# Patient Record
Sex: MALE | Race: WHITE
[De-identification: names, ages, dates, MRNs, and addresses within clinical notes are randomized per-mention and may not be internally consistent; named-entity substitution may affect disease eponyms.]

---

## 2021-03-13 NOTE — NUR
SHIFT SUMMARY
PT A&OX4, VSS, NPO, PLAN FOR PEGTUBE PLACEMENT TOMORROW. IVF @ 75 MLS/HR. PAIN
MANAGED WITH 25 MCG. VOIDING WELL. USES CALL LIGHT APPROPRIATELY. WILL REPORT
TO ONCOMING NOC RN.

## 2021-03-14 NOTE — NUR
SHIFT SUMMARY
PT A&OX4, VSS, AMBULATING INDEPENDENT TO BRP, VOIDING WELL, NPO, CBGS AC/HS.
PAIN MANAGED WITH 50 MCGS FENT. S/P GASTROSOMY TUBE PLACEMENT; TUBE FEEDINGS
RUNNING PER ORDER. WILL REPORT TO ONCOMING NOC RN.

## 2021-03-14 NOTE — NUR
SHIFT SUMMARY:
MONET IS A&OX4. BP ELEVATED, HYDRALAZINE MINIMALLY EFFECTIVE. PT REPORTS GOOD
PAIN RELIEF WITH 50 MCG OF FENTANYL. HE IS INDEPENDENT IN THE ROOM. IV TO L AC
PATENT. HE REMAINS NPO, SWABS PROVIDED. HE USES THE CALL LIGHT APPROPRIATELY.
HE IS LYING IN BED WITH THE CALL LIGHT IN REACH. WILL REPORT TO DAY SHIFT RN.

## 2021-03-14 NOTE — NUR
03/14/21 1324 Ector Valderrama
History, Chart, Medications and Allergies reviewed before start of
procedure.MONITOR INTACT WITH CONTINUOUS PULSE OXIMETRY AND
INTERMITTENT BP.3-LEAD EKG REVIEWED WITH PHYSICIAN PRIOR TO START OF
PROCEDURE.O2 VIA N/C INTACT THROUGHOUT SEDATION/PROCEDURE. See
Anesthesia record.

## 2021-03-14 NOTE — NUR
History, Chart, Medications and Allergies reviewed before start of
procedure.
Pre-Op teaching done. Pt verbalizes understanding.

## 2021-03-15 NOTE — NUR
pt has remained a/o x 4, pleasant/cooperative, has worked with physical
therapy, has ambulated to bathroom and in the hallway with wheelchair. pt has
had visits from  this afternoon. pt denies n/v, was evaluated by speech
therapy.

## 2021-03-15 NOTE — NUR
SHIFT SUMMARY:
MONET IS A&OX4. VS W/CONTINUED ELEVATION OF BP WITH MINIMAL IMPROVEMENT AFTER
HYDRALAZINE. HE IS PARTICIPATING WITH HIS PEG TUBE, ATTENTIVE TO EDUCATION AND
WILLING TO BE HANDS-ON. HE REPORTS ADEQUATE PAIN CONTROL WITH 50 MCG OF
FENTANYL. IV TO L AC PATENT. HE IS INDEPENDENT IN THE ROOM, USES THE
WHEELCHAIR TO GO ON "WALKS" AROUND THE HOSPITAL. HE IS LYING IN BED WITH THE
CALL LIGHT IN REACH. WILL REPORT TO DAY SHIFT RN.

## 2021-03-15 NOTE — NUR
assumed care of pt following receiving report from previous RN. pt just out of
shower, dressed and sitting on edge of bed, provided with ice water.

## 2021-03-16 NOTE — NUR
SHIFT SUMMARY
PT TOLERATING INTERMITTEN FEEDS SO FAR TODAY. DENIES FULLNESS OR ABD
DISTENTION. PT HAS NOT HAD A BM IN 5-6 DAYS PER THE PT. BOWEL CARE GIVEN T/O
THE DAY. INCLUDING PRUNE JUICE. AFTERNOON BP ELEVATED. RECHECKED IN THE 170S
SYSTOLIC. PT HOPING TO BE DISCHARGED TOMORROW TO A REHAB CENTER. NO OTHER
ACUTE CHANGES IN ASSESSMENT AT THIS TIME. VS REVIEWED. PT RESTING IN BED &
DENIES OTHER NEEDS AT THIS TIME. CALL LIGHT IN REACH.

## 2021-03-16 NOTE — NUR
SHIFT SUMMARY
AOX4. SLURRED SPEECH. PLEASENT & COOPERATIVE. REPORTS 7/10 CHRONIC BACK PAIN,
MEDICATED 1X c PERCOCET & PT ABLE TO REST. POD 2 FOR PEG TUBE PLACEMENT, PT
NPO. DENIES N/V, ABD PAIN. ACTIVE BT. MEDS CRUSHED & PT. PT REFUSED 2100
FEEDING, STATING HE DIDNT FEEL HUNGRY & WANTED TO BE DISCONECTED FROM PUMP. BP
ELEVATED @170 SYSTOLIC THIS AM, GAVE 10MG HYDRALAZINE, WILL RECHECK BP. HS CBG
@117. AWAITING SNF PLACEMENT. CALL LIGHT IN REACH & WCTM.

## 2021-03-17 NOTE — NUR
SHIFT SUMMARY:
SEVERE DYSPASIA
PATIENT IS ALERT AND ORIENTED X4 WHILE AWAKE. HE HAS BEEN SLEEPING MAJORITY OF
THE SHIFT BUT EASILY AROUSABLE. HE DOES HAVE A HIGH BP BUT OTHERWISE HAS WNL
VS AND IS ON RA. PAIN IS CONTROLLED WITH 1 PERCOCET. HIS PEG TUBE IS WNL. HE
HAS BEEN GETTING BOLUS FEEDS Q4H. HIS PILLS ARE ALSO CRUSHED AND GIVEN THROUGH
PEG TUBE. PATIENT DENIES ANY NAUSEA OR VOMITING WITH FEEDINGS. HE HAS BOWEL
SOUNDS IN ALL FOUR QUADRANTS. HE WALKS WITH A CANE AND IS INDEPENDANT. HE DOES
HAVE A SLIGHT LEFT SIDED WEAKNESS AND A SLUR WITH HIS SPEECH FROM A STROKE HE
HAD A MONTH AGO.
CALLS APPROPRIATELY. CALL LIGHT WITHIN REACH.
THE PLAN IS TO BE DISCHARGED TO A [facility] REHAB LATER TODAY.

## 2021-03-18 NOTE — NUR
SHIFT SUMMARY
PT RESTED WELL THIS SHIFT. AAOX4. NPO. DISCOMFORT CONTROLLED WITH 2 PERCOCET
CRUSHED PER TUBE. NO NAUSEA/EMESIS. PEG TUBE SECURE/NO DRAINAGE. FEEDINGS PER
TUBE PER ORDERS. HTN NOTED, DR NOTIFIED + NEW ORDER FOR IV HYDRALAZINE
ADMINISTERED, BP DECREASED POST ADMINISTRATION. x1 MODERATE SIZE FORMED
STOOL THIS SHIFT. AWAITING TRANSFER TODAY. PT CURRENTLY RESTING IN BED WITH
CALL LIGHT IN REACH.

## 2021-03-18 NOTE — NUR
SHIFT SUMMARY
PT A&OX4, VSS, NPO/PEG TUBE FEEDINGS PER ORDERS. AMBULATING HALLWAYS,
INDEPENDENT TO BRP. DENIES PAIN. SHOWERED TODAY. PLAN FOR TRANSFER TOMORROW.
REPORT PROVIDED TO JOHN RICE.

## 2021-03-19 NOTE — NUR
SPEECH THERAPY DONE SEEING PT, INT FEED INITIATED AT THIS TIME. PT RESTING IN
BED LISTENING TO BOOKS ON TAPE. DENIES PAIN OR NAUSEA AT THIS TIME.

## 2021-03-19 NOTE — NUR
SHIFT SUMMARY
PT RESTED WELL T/O NIGHT. AAOX4. DISCOMFORT CONTROLLED WITH 2 PERCOCET
YESTARDAY EVENING. NO NAUSEA/EMESIS. STRICT NPO. PEG TUBE SECURE WITH
SCHEDULED BOLUS FEEDINGS. ORAL CARE Q4H WHILE AWAKE. NO ACUTE CHANGES OVER
NIGHT. PT AWOKE EARLY THIS AM + AMBULATED IN HALLS INDEPENDENT. AWAITING AM
LABS + 0600 BOLUS FEEDING. PT CURRENTLY RESTING IN ROOM WITH CALL LIGHT IN
REACH.

## 2021-03-19 NOTE — NUR
SHIFT SUMMARY
AA0X4. PT HAS BEEN AMBULATING FREQUENTLY DURING THE DAY. TOLERATING HIS
FEEDINGS WELL, DENIES ANY NAUSEA. PAIN MANAGED PER EMAR ONCE THIS AM, HAS
DENIED PAIN OTHERWISE. PLAN IS FOR PATIENT TO GO TO SNF AT THE START OF NEXT
WEEK.

## 2021-03-20 NOTE — NUR
SHIFT SUMMARY:
 
NO ACUTE CHANGES THIS SHIFT. PT AMBULATING IN HALLWAYS FREQUENTLY. TOLERATING
FEEDS WELL. DENIES N/V. REPORTS PASSING FLATUS. PAIN MANAGED WITH PERCOCET PER
EMAR. PT MEDICATED ONCE THIS SHIFT. PLAN FOR PT/OT. AWAITING SNF PLACEMENT. PT
PLEASANT AND COOPERATIVE WITH CARE.

## 2021-03-20 NOTE — NUR
SUMMARY
REPORTS TOLERATING TUBE FEEDINGS WELL, DENIES ANY ABD DISCOMFORT, AMBULATES
INDEPENDENTLY WITH A CANE TO GO OUTSIDE, PT DOES OWN ORAL CARE PRN, NO ACUTE
CHANGES THIS SHIFT.

## 2021-03-21 NOTE — NUR
SUMMARY
PT FELT "SLEEPY" TODAY, STATES DIDN'T SLEEP WELL LAST NIGHT, C/O CHRONIC LOW
BACK PAIN, REPORTS HAVING ADEQUATE PAIN CONTROL WITH PERCOCET, AMBULATED
OUTSIDE TO SMOKE X6 TODAY, DISCUSSED SMOKING CESSATION WITH PT BUT HAS NO
INTEREST IN QUITTING, REPORTS TOLERATING TUBE FEEDS WELL, BP BETTER TODAY, NO
OTHER CHANGES THIS SHIFT.

## 2021-03-21 NOTE — NUR
PT HAD NO ACUTE CHANGES T/O NIGHT; BP ELEVATED BUT BELOW PRN PARAMETERS. PT
DENIED CP/PRESSURE. PT ARIELA TUBE FEEDINGS, ABD SOFT, BT ACTIVE, PT REP +FLATUS
AND BM. AM TUBE FEED STARTED AT 0630 THIS AM, TUBING CHANGED AT THIS TIME AS
WELL. PT AMB INDEP W/CANE FREQ OUTSIDE OF ROOM. PT IS FORGETFUL AT TIMES R/T
DATES, REORIENTS EASILY. REPORT GIVEN TO FRANCISCO WALSH RN.

## 2021-03-22 NOTE — NUR
PT VSS, BP ELEVATED EARLY IN NIGHT, IMPROVED AFTER SCHEDULED BP MEDS. PT
DENIED CP/PRESSURE. PT APPEARED TO SLEEP FOR MAJORITY OF NIGHT. TUBE FEEDS
STARTED AT 0630 THIS AM, TUBING CHANGED AT THIS TIME. PT REMAINED NPO, CONT TO
EXPRESS  STRONG DESIRE TO EAT, ALTHOUGH VERBALIZES UNDERSTANDING OF IMPORTANCE
OF NPO AND PLAN FOR REHAB. CONT TO AWAIT DC PLANS TO IRU.

## 2021-03-22 NOTE — NUR
TUBE FEEDING
PT GIVEN A 240 ML JEVITY FEEDING AT 0900 AND 1200 WITH 100 ML WATER FLUSH
BEFORE AND AFTER EACH FEEDING. PT TOLERATED WELL AND DENIES PAIN, BLOATING. NO
RESISTANCE NOTED. DRESSING C/D/I AT THIS TIME.

## 2021-03-22 NOTE — NUR
RECEIVED REPORT AND ASSUMED CARE OF PT. MONET IS UP TO THE BATHROOM, PAIN
MEDICATION PROVIDED AT HIS REQUEST. HE IS ABLE TO MAKE HIS NEEDS KNOWN. [place].

## 2021-03-22 NOTE — NUR
SHIFT SUMMARY
PT ALERT AND INDEPENDENT IN ROOM THROUGH OUT SHIFT. SLURRED SPEECH
AND OCCASIONLLY FORGETFUL. GIVEN 240 ML JEVITY PEG TUBE FEEDING WITH 100 ML
WATER FLUSH BEFORE AND AFTER AT 1500 AND 1800. PT TOLERATED WELL. PT
PERFORMING Q3 HR ORAL CARE WITH SUCTION TOOTH BRUSH. ABD SOFT AND NON-TENDER.
PEG TUBE DRESSING C/D/I. MEDICATED FOR PAIN AND BLOOD GLUCOSE PER EMAR.
WAITING FOR SNF PLACEMENT, NOTHING POTENTIALLY AVAIL UNTIL WEDNESDAY 03/24 PER
CASE MANAGEMENT.

## 2021-03-23 NOTE — NUR
SHIFT SUMMARY
PATIENT IS ALERT AND INDEPENDENT IN ROOM THROUGHOUT SHIFT. FORGETFUL AT TIMES
AND SPEECH IS SLURRED. PEG TUBE IS PATENT AND DRESSING C/D/I. Q3 HR TUBE
FEEDINGS DURING DAY. FEEDINGS SWITCHED TO GRAVITY FEED THIS SHIFT. PATIENT
ABLE TO SELF PERFORM ORAL CARE. DENIES ABD PAIN. VOIDING WELL. PLAN TO
DISCHARGE TO SNF 03/24/21 IF BED AVAILABLE. MEDICATED FOR CHRONIC BACK PAIN
PER EMAR.

## 2021-03-23 NOTE — NUR
DIETARY NOTE
SPOKE WITH DIETARY. THE BOLUS TUBE FEEDINGS ARE INTERFERING WITH THE
SPEECH/SWALLOW THERAPY. DIETARY IS GOING TO CHANGE THE FEEDINGS TO GRAVITY
FEED WHICH WILL BE FASTER. WILL WATCH FOR ORDERS.

## 2021-03-23 NOTE — NUR
SHIFT SUMMARY:
MONET IS A&OX4. VSS, NO ACUTE EVENTS OVERNIGHT. HE IS TOLERATING THE TUBE
FEEDINGS WELL AND REPORTS ADEQUATE PAIN CONTROL WITH ONE TABLET OF PERCOCET.
HE IS INDEPENDENT IN THE ROOM AND HALLWAYS. HE STATES THAT HE IS WAITING FOR A
BED TO OPEN UP SO THAT HE CAN RECEIVE INPATIENT THERAPY. HE EXPRESSES HOPE
THAT HE WILL BE ABLE TO EAT AGAIN. HE IS LYING IN BED WITH THE CALL LIGHT IN
REACH. WILL REPORT TO DAY SHIFT RN.

## 2021-03-24 NOTE — NUR
SHIFT SUMMARY:
 
NO SIGNIFICANT CHANGES THIS SHIFT. PEG TUBE WNL AND FLUSHED DURING MEDICATION
ADMINISTRATION. PT MEDICATED ONCE WITH PEROCOCET PER EMAR. INDPENDENT AND
AMBULATING FREQUENTLY IN HALLWAY. AWAITING SNF PLACEMENT.

## 2021-03-24 NOTE — NUR
FACILITY TRANSFER: 0900 TUBE FEEDING GIVEN AS WELL AS A FEEDING AT 1145. PT
GIVEN PACKET, IV DC'D. PT LEFT UNIT AT 1200 VIA WHEELCHAIR WITH AARON SALINAS.

## 2023-08-28 ENCOUNTER — HOSPITAL ENCOUNTER (INPATIENT)
Dept: HOSPITAL 95 - ER | Age: 48
LOS: 4 days | Discharge: NURSING, REHAB OR LONG-TERM CARE | DRG: 65 | End: 2023-09-01
Attending: HOSPITALIST | Admitting: HOSPITALIST
Payer: COMMERCIAL

## 2023-08-28 VITALS — SYSTOLIC BLOOD PRESSURE: 201 MMHG | DIASTOLIC BLOOD PRESSURE: 92 MMHG

## 2023-08-28 VITALS — BODY MASS INDEX: 24.46 KG/M2 | WEIGHT: 161.38 LBS | HEIGHT: 68 IN

## 2023-08-28 VITALS — DIASTOLIC BLOOD PRESSURE: 102 MMHG | SYSTOLIC BLOOD PRESSURE: 192 MMHG

## 2023-08-28 VITALS — SYSTOLIC BLOOD PRESSURE: 189 MMHG | DIASTOLIC BLOOD PRESSURE: 91 MMHG

## 2023-08-28 DIAGNOSIS — Z66: ICD-10-CM

## 2023-08-28 DIAGNOSIS — G81.91: ICD-10-CM

## 2023-08-28 DIAGNOSIS — E83.42: ICD-10-CM

## 2023-08-28 DIAGNOSIS — F17.210: ICD-10-CM

## 2023-08-28 DIAGNOSIS — Z20.822: ICD-10-CM

## 2023-08-28 DIAGNOSIS — E11.319: ICD-10-CM

## 2023-08-28 DIAGNOSIS — Z79.84: ICD-10-CM

## 2023-08-28 DIAGNOSIS — I63.89: Primary | ICD-10-CM

## 2023-08-28 DIAGNOSIS — Z51.5: ICD-10-CM

## 2023-08-28 DIAGNOSIS — Z98.1: ICD-10-CM

## 2023-08-28 DIAGNOSIS — Z79.82: ICD-10-CM

## 2023-08-28 DIAGNOSIS — E87.6: ICD-10-CM

## 2023-08-28 DIAGNOSIS — Z86.73: ICD-10-CM

## 2023-08-28 DIAGNOSIS — I10: ICD-10-CM

## 2023-08-28 DIAGNOSIS — E53.8: ICD-10-CM

## 2023-08-28 DIAGNOSIS — Z79.899: ICD-10-CM

## 2023-08-28 DIAGNOSIS — Z88.8: ICD-10-CM

## 2023-08-28 DIAGNOSIS — R47.1: ICD-10-CM

## 2023-08-28 DIAGNOSIS — I65.23: ICD-10-CM

## 2023-08-28 LAB
ALBUMIN SERPL BCP-MCNC: 4 G/DL (ref 3.4–5)
ALBUMIN/GLOB SERPL: 1.4 {RATIO} (ref 0.8–1.8)
ALT SERPL W P-5'-P-CCNC: 31 U/L (ref 12–78)
ANION GAP SERPL CALCULATED.4IONS-SCNC: 6 MMOL/L (ref 6–16)
AST SERPL W P-5'-P-CCNC: 17 U/L (ref 12–37)
BASOPHILS # BLD AUTO: 0.07 K/MM3 (ref 0–0.23)
BASOPHILS NFR BLD AUTO: 1 % (ref 0–2)
BILIRUB SERPL-MCNC: 0.7 MG/DL (ref 0.1–1)
BUN SERPL-MCNC: 18 MG/DL (ref 8–24)
CALCIUM SERPL-MCNC: 9.3 MG/DL (ref 8.5–10.1)
CHLORIDE SERPL-SCNC: 110 MMOL/L (ref 98–108)
CO2 SERPL-SCNC: 26 MMOL/L (ref 21–32)
CREAT SERPL-MCNC: 0.52 MG/DL (ref 0.6–1.2)
DEPRECATED RDW RBC AUTO: 37.5 FL (ref 35.1–46.3)
EOSINOPHIL # BLD AUTO: 0.15 K/MM3 (ref 0–0.68)
EOSINOPHIL NFR BLD AUTO: 1 % (ref 0–6)
ERYTHROCYTE [DISTWIDTH] IN BLOOD BY AUTOMATED COUNT: 11.7 % (ref 11.7–14.2)
GLOBULIN SER CALC-MCNC: 2.9 G/DL (ref 2.2–4)
GLUCOSE SERPL-MCNC: 181 MG/DL (ref 70–99)
HCT VFR BLD AUTO: 43.1 % (ref 37–53)
HGB BLD-MCNC: 15.8 G/DL (ref 13.5–17.5)
IMM GRANULOCYTES # BLD AUTO: 0.03 K/MM3 (ref 0–0.1)
IMM GRANULOCYTES NFR BLD AUTO: 0 % (ref 0–1)
LYMPHOCYTES # BLD AUTO: 3.04 K/MM3 (ref 0.84–5.2)
LYMPHOCYTES NFR BLD AUTO: 28 % (ref 21–46)
MCHC RBC AUTO-ENTMCNC: 36.7 G/DL (ref 31.5–36.5)
MCV RBC AUTO: 88 FL (ref 80–100)
MONOCYTES # BLD AUTO: 0.66 K/MM3 (ref 0.16–1.47)
MONOCYTES NFR BLD AUTO: 6 % (ref 4–13)
NEUTROPHILS # BLD AUTO: 6.74 K/MM3 (ref 1.96–9.15)
NEUTROPHILS NFR BLD AUTO: 63 % (ref 41–73)
NRBC # BLD AUTO: 0 K/MM3 (ref 0–0.02)
NRBC BLD AUTO-RTO: 0 /100 WBC (ref 0–0.2)
PLATELET # BLD AUTO: 276 K/MM3 (ref 150–400)
POTASSIUM SERPL-SCNC: 4.1 MMOL/L (ref 3.5–5.5)
PROT SERPL-MCNC: 6.9 G/DL (ref 6.4–8.2)
PROTHROMBIN TIME: 10.3 SEC (ref 9.7–11.5)
SODIUM SERPL-SCNC: 142 MMOL/L (ref 136–145)

## 2023-08-28 PROCEDURE — A9270 NON-COVERED ITEM OR SERVICE: HCPCS

## 2023-08-28 PROCEDURE — U0002 COVID-19 LAB TEST NON-CDC: HCPCS

## 2023-08-28 NOTE — NUR
LATE ENTRY
PT ADMIT FROM ER. ALERT AND ORIENTED X4. SLURRED SPEECH AND IMPARIED GAIT AT
BASELINE. PER PT AND PARTNER, PT IS AT HIS BASELINE. PARTNER STATED THAT PT
HAS FALLEN BEFORE DUE TO DEFICITS. ABLE TO MAKE NEEDS KNOWN AT THIS TIME.
BED ALARM ON FOR PT SAFTEY.

## 2023-08-29 VITALS — DIASTOLIC BLOOD PRESSURE: 90 MMHG | SYSTOLIC BLOOD PRESSURE: 162 MMHG

## 2023-08-29 VITALS — SYSTOLIC BLOOD PRESSURE: 178 MMHG | DIASTOLIC BLOOD PRESSURE: 85 MMHG

## 2023-08-29 VITALS — DIASTOLIC BLOOD PRESSURE: 99 MMHG | SYSTOLIC BLOOD PRESSURE: 202 MMHG

## 2023-08-29 VITALS — SYSTOLIC BLOOD PRESSURE: 182 MMHG | DIASTOLIC BLOOD PRESSURE: 99 MMHG

## 2023-08-29 VITALS — DIASTOLIC BLOOD PRESSURE: 100 MMHG | SYSTOLIC BLOOD PRESSURE: 173 MMHG

## 2023-08-29 VITALS — DIASTOLIC BLOOD PRESSURE: 89 MMHG | SYSTOLIC BLOOD PRESSURE: 182 MMHG

## 2023-08-29 VITALS — DIASTOLIC BLOOD PRESSURE: 88 MMHG | SYSTOLIC BLOOD PRESSURE: 162 MMHG

## 2023-08-29 VITALS — SYSTOLIC BLOOD PRESSURE: 187 MMHG | DIASTOLIC BLOOD PRESSURE: 95 MMHG

## 2023-08-29 VITALS — DIASTOLIC BLOOD PRESSURE: 91 MMHG | SYSTOLIC BLOOD PRESSURE: 166 MMHG

## 2023-08-29 VITALS — DIASTOLIC BLOOD PRESSURE: 82 MMHG | SYSTOLIC BLOOD PRESSURE: 149 MMHG

## 2023-08-29 LAB
ALBUMIN SERPL BCP-MCNC: 3.7 G/DL (ref 3.4–5)
ANION GAP SERPL CALCULATED.4IONS-SCNC: 6 MMOL/L (ref 6–16)
BASOPHILS # BLD AUTO: 0.05 K/MM3 (ref 0–0.23)
BASOPHILS NFR BLD AUTO: 1 % (ref 0–2)
BUN SERPL-MCNC: 15 MG/DL (ref 8–24)
CALCIUM SERPL-MCNC: 8.7 MG/DL (ref 8.5–10.1)
CHLORIDE SERPL-SCNC: 109 MMOL/L (ref 98–108)
CO2 SERPL-SCNC: 27 MMOL/L (ref 21–32)
CREAT SERPL-MCNC: 0.53 MG/DL (ref 0.6–1.2)
DEPRECATED RDW RBC AUTO: 37.2 FL (ref 35.1–46.3)
EOSINOPHIL # BLD AUTO: 0.27 K/MM3 (ref 0–0.68)
EOSINOPHIL NFR BLD AUTO: 3 % (ref 0–6)
ERYTHROCYTE [DISTWIDTH] IN BLOOD BY AUTOMATED COUNT: 11.6 % (ref 11.7–14.2)
GLUCOSE SERPL-MCNC: 158 MG/DL (ref 70–99)
HCT VFR BLD AUTO: 40.5 % (ref 37–53)
HGB BLD-MCNC: 14.8 G/DL (ref 13.5–17.5)
IMM GRANULOCYTES # BLD AUTO: 0.03 K/MM3 (ref 0–0.1)
IMM GRANULOCYTES NFR BLD AUTO: 0 % (ref 0–1)
LYMPHOCYTES # BLD AUTO: 4.26 K/MM3 (ref 0.84–5.2)
LYMPHOCYTES NFR BLD AUTO: 43 % (ref 21–46)
MCHC RBC AUTO-ENTMCNC: 36.5 G/DL (ref 31.5–36.5)
MCV RBC AUTO: 88 FL (ref 80–100)
MONOCYTES # BLD AUTO: 0.75 K/MM3 (ref 0.16–1.47)
MONOCYTES NFR BLD AUTO: 8 % (ref 4–13)
NEUTROPHILS # BLD AUTO: 4.57 K/MM3 (ref 1.96–9.15)
NEUTROPHILS NFR BLD AUTO: 46 % (ref 41–73)
NRBC # BLD AUTO: 0 K/MM3 (ref 0–0.02)
NRBC BLD AUTO-RTO: 0 /100 WBC (ref 0–0.2)
PHOSPHATE SERPL-MCNC: 3.4 MG/DL (ref 2.5–4.9)
PLATELET # BLD AUTO: 294 K/MM3 (ref 150–400)
POTASSIUM SERPL-SCNC: 3 MMOL/L (ref 3.5–5.5)
SODIUM SERPL-SCNC: 142 MMOL/L (ref 136–145)

## 2023-08-29 NOTE — NUR
NOTE:
 
PT'S BP NOT DECREASING AFTER 5MG OF IV METOPROLOL PER THE EMAR, PROVIDER
NOTIFIED.  NEW ORDERS FOR 10MG IV METOPROLOL ORDERED.  WILL GIVE OTHER 5MG TO
MAKE TOTAL OF 10MG.

## 2023-08-29 NOTE — NUR
PATIENT A/OX4, UP WITH CANE AND SBA. PATIENT FORGETS LIMITATIONS AND FALL
PRECAUTIONS ARE IN PLACE. B/P ELEVATED THIS SHIFT SHIFT, LOSARTAN AND IV
METOPROLOL GIVEN TO TREAT WHICH DID BRING IT DOWN SOME. SR ON TELE, DENIES ANY
CP OR PRESSURE. REPORTS CHRONIC BACK PAIN, TYLENOL GIVEN X1 TO TREAT. PATIENT
UNSTADY ON FEET, PATIENT EQUALLY WEAK ON BOTH SIDES, REPORTS SLURRED SPEECH IS
AT THIS BASELINE. SKIN INTACT. CONTINENT OF BOWEL/BLADDER. CALM AND
COOPERATIVE WITH CARE.

## 2023-08-29 NOTE — NUR
NOTE:
 
SPOKE WITH PT'S DAUGHTER, PRATIK, ON THE PHONE.  GAVE HER AN UPDATE.  SHE HAD
NO FURTHER QUESTIONS.  THE PT VERBALIZED CONSENT TO SPEAK WITH HIS DAUGHTER
ABOUT HIS CURRENT STATE.

## 2023-08-29 NOTE — NUR
SHIFT SUMMARY
 
PT AOX4, 1 ASSIST WITH THE FWW AND GB.  WORKED WITH SPEECH THERAPY TODAY, NEW
ORDERS PER THE CHART.  ALSO WORKED WITH PT/OT THIS SHIFT, SEE THEIR NOTES.  PT
HAS HAD NO COMPLAINTS, HE HAS BEEN LISTENING TO AUDIO BOOKS IN HIS ROOM.  MG
REPLACED PER THE EMAR THIS SHIFT.  PT HAS BEEN EATING WELL EVEN WITH THE
CONSISTENCY CHANGE.  IV METOPROLOL GIVEN DUE TO ELEVATED SYSTOLIC PRESSURE
THIS AFTERNOON.  TELE NOTIFIED AND MONITORED.  CALL LIGHT WITHIN REACH, BED IN
THE LOWEST POSITION.  WILL REPORT TO ONCOMING NURSE.

## 2023-08-29 NOTE — NUR
NOTE:
 
PT'S BLOOD PRESSURE STILL ELEVATED AFTER ADMINISTERING IV METOPROLOL PER EMAR.
 DR. GARCIA NOTIFIED AND ORDERS FOR 10MG IV METOPROLOL OBTAINED.  THIS NURSE
ENTERED THE ORDER INTO THE CHART, PHARMACY CALLED AND SAID THAT THE ORDER
SHOULD BE WRITTEN AS A RANGE FROM 5MG-10MG.  THEY WOULD NOT VERIFY IT UNTIL
SPEAKING TO THE PROVIDER AGAIN.  THIS NURSE CALLED THE PROVIDER AGAIN AND SHE
DID NOT ANSWER.  WAITING FOR THE PROVIDER TO CALL BACK TO OBTAIN THE NEW
ORDER.

## 2023-08-30 VITALS — DIASTOLIC BLOOD PRESSURE: 94 MMHG | SYSTOLIC BLOOD PRESSURE: 183 MMHG

## 2023-08-30 VITALS — DIASTOLIC BLOOD PRESSURE: 91 MMHG | SYSTOLIC BLOOD PRESSURE: 200 MMHG

## 2023-08-30 VITALS — DIASTOLIC BLOOD PRESSURE: 79 MMHG | SYSTOLIC BLOOD PRESSURE: 155 MMHG

## 2023-08-30 VITALS — DIASTOLIC BLOOD PRESSURE: 98 MMHG | SYSTOLIC BLOOD PRESSURE: 206 MMHG

## 2023-08-30 VITALS — DIASTOLIC BLOOD PRESSURE: 77 MMHG | SYSTOLIC BLOOD PRESSURE: 150 MMHG

## 2023-08-30 VITALS — SYSTOLIC BLOOD PRESSURE: 177 MMHG | DIASTOLIC BLOOD PRESSURE: 93 MMHG

## 2023-08-30 LAB
ALBUMIN SERPL BCP-MCNC: 3.7 G/DL (ref 3.4–5)
ANION GAP SERPL CALCULATED.4IONS-SCNC: 6 MMOL/L (ref 6–16)
BUN SERPL-MCNC: 12 MG/DL (ref 8–24)
CALCIUM SERPL-MCNC: 8.6 MG/DL (ref 8.5–10.1)
CHLORIDE SERPL-SCNC: 110 MMOL/L (ref 98–108)
CO2 SERPL-SCNC: 27 MMOL/L (ref 21–32)
CREAT SERPL-MCNC: 0.51 MG/DL (ref 0.6–1.2)
GLUCOSE SERPL-MCNC: 147 MG/DL (ref 70–99)
PHOSPHATE SERPL-MCNC: 3.6 MG/DL (ref 2.5–4.9)
POTASSIUM SERPL-SCNC: 3.2 MMOL/L (ref 3.5–5.5)
SODIUM SERPL-SCNC: 143 MMOL/L (ref 136–145)

## 2023-08-30 NOTE — NUR
PATIENT A/OX4, UP WITH FWW AND SBA. B/P REMAINED HIGH MOST OF THIS SHIFT.
HOSPITALIST NOTIFIED AND LOSARTAN INCREASED. HYDRALAZINE PO AND LEBETOLOL
IV ORDERED PRN. B/P 200/91 THIS MORNING, LEBETOLOL AND HYDRALAZINE GIVEN TO
TREAT, B/P CAME DOWN /79.  PATIENT DID GREAT TAKING PILLS WHOLE WITH
APPLESAUCE. VERY PLEASANT AND COOPERATIVE WITH CARE. SR ON TELE WITH HR IN THE
70'S. NO NEURO CHANGES THIS SHIFT.

## 2023-08-30 NOTE — NUR
THE PATIENT IS A&O X3, PLEASENT TO VISIT WITH AND FOLLOWS COMMANDS. THE
PATIENT IS INDEPENDENT TO GO TO THE REST ROOM WITH HIS WALKER. THE PATIENT'S
FAMILY WAS IN TO VISIT TWICE TODAY AND TALKED WITH HOME HEALTH. THE PATIENT IS
EATING DINNER AND RESTING AT THIS TIME, I WILL CONTINUE TO MONITOR.

## 2023-08-31 VITALS — SYSTOLIC BLOOD PRESSURE: 188 MMHG | DIASTOLIC BLOOD PRESSURE: 90 MMHG

## 2023-08-31 VITALS — DIASTOLIC BLOOD PRESSURE: 92 MMHG | SYSTOLIC BLOOD PRESSURE: 145 MMHG

## 2023-08-31 VITALS — SYSTOLIC BLOOD PRESSURE: 156 MMHG | DIASTOLIC BLOOD PRESSURE: 85 MMHG

## 2023-08-31 VITALS — SYSTOLIC BLOOD PRESSURE: 171 MMHG | DIASTOLIC BLOOD PRESSURE: 93 MMHG

## 2023-08-31 VITALS — SYSTOLIC BLOOD PRESSURE: 168 MMHG | DIASTOLIC BLOOD PRESSURE: 95 MMHG

## 2023-08-31 VITALS — SYSTOLIC BLOOD PRESSURE: 172 MMHG | DIASTOLIC BLOOD PRESSURE: 98 MMHG

## 2023-08-31 LAB
ALBUMIN SERPL BCP-MCNC: 3.7 G/DL (ref 3.4–5)
ANION GAP SERPL CALCULATED.4IONS-SCNC: 5 MMOL/L (ref 6–16)
BASOPHILS # BLD AUTO: 0.06 K/MM3 (ref 0–0.23)
BASOPHILS NFR BLD AUTO: 1 % (ref 0–2)
BUN SERPL-MCNC: 11 MG/DL (ref 8–24)
CALCIUM SERPL-MCNC: 8.6 MG/DL (ref 8.5–10.1)
CHLORIDE SERPL-SCNC: 109 MMOL/L (ref 98–108)
CO2 SERPL-SCNC: 28 MMOL/L (ref 21–32)
CREAT SERPL-MCNC: 0.58 MG/DL (ref 0.6–1.2)
DEPRECATED RDW RBC AUTO: 36.9 FL (ref 35.1–46.3)
EOSINOPHIL # BLD AUTO: 0.17 K/MM3 (ref 0–0.68)
EOSINOPHIL NFR BLD AUTO: 2 % (ref 0–6)
ERYTHROCYTE [DISTWIDTH] IN BLOOD BY AUTOMATED COUNT: 11.6 % (ref 11.7–14.2)
GLUCOSE SERPL-MCNC: 148 MG/DL (ref 70–99)
HCT VFR BLD AUTO: 40.2 % (ref 37–53)
HGB BLD-MCNC: 14.7 G/DL (ref 13.5–17.5)
IMM GRANULOCYTES # BLD AUTO: 0.03 K/MM3 (ref 0–0.1)
IMM GRANULOCYTES NFR BLD AUTO: 0 % (ref 0–1)
LYMPHOCYTES # BLD AUTO: 2.72 K/MM3 (ref 0.84–5.2)
LYMPHOCYTES NFR BLD AUTO: 34 % (ref 21–46)
MAGNESIUM SERPL-MCNC: 1.6 MG/DL (ref 1.6–2.4)
MCHC RBC AUTO-ENTMCNC: 36.6 G/DL (ref 31.5–36.5)
MCV RBC AUTO: 88 FL (ref 80–100)
MONOCYTES # BLD AUTO: 0.71 K/MM3 (ref 0.16–1.47)
MONOCYTES NFR BLD AUTO: 9 % (ref 4–13)
NEUTROPHILS # BLD AUTO: 4.35 K/MM3 (ref 1.96–9.15)
NEUTROPHILS NFR BLD AUTO: 54 % (ref 41–73)
NRBC # BLD AUTO: 0 K/MM3 (ref 0–0.02)
NRBC BLD AUTO-RTO: 0 /100 WBC (ref 0–0.2)
PHOSPHATE SERPL-MCNC: 3.4 MG/DL (ref 2.5–4.9)
PLATELET # BLD AUTO: 294 K/MM3 (ref 150–400)
POTASSIUM SERPL-SCNC: 3 MMOL/L (ref 3.5–5.5)
SARS-COV-2 RNA RESP QL NAA+PROBE: NEGATIVE
SODIUM SERPL-SCNC: 142 MMOL/L (ref 136–145)

## 2023-08-31 NOTE — NUR
THE PATIENT IS A&O X4, FOLLOWS COMMANDS AND IS PLEASANT TO VISIT WITH. THE
PATIENT MAY POSSIBLE GO HOME TOMORROW, THE PATIENT HAD AN ELEVATED B/P AT 1530
AND RECEIVED LABETOLOL. THE PATIENT IS RESTING AT THIS TIME, I WILL CONTINUE
TO MONITOR.

## 2023-08-31 NOTE — NUR
END OF SHIFT SUMMARY
PT PLEASANT AND COOPERATIVE WITH CARE PROVIDED. PT ASLEEP AROUND 0200. VSS, BP
STABLIZIED /92, HR 77 BPM. PT RECEIVED PRN HYDRALAZINE AND LABETALOL
PER ORDERS. PT A&O x4. PT AMBULATES INDEPENDENTLY WITH A STEADY GAIT. SPEECH
SLURRED, AND SOFT BUT UNDERSTANDABLE. PT DENIED ANY SOB, CP OR HEADACHES. PT
ABLE TO MAKE NEEDS KNOWN, CALL LIGHT WITHIN REACH, WCTM.

## 2023-09-01 VITALS — SYSTOLIC BLOOD PRESSURE: 149 MMHG | DIASTOLIC BLOOD PRESSURE: 91 MMHG

## 2023-09-01 VITALS — DIASTOLIC BLOOD PRESSURE: 84 MMHG | SYSTOLIC BLOOD PRESSURE: 160 MMHG

## 2023-09-01 LAB
ALBUMIN SERPL BCP-MCNC: 3.7 G/DL (ref 3.4–5)
ANION GAP SERPL CALCULATED.4IONS-SCNC: 6 MMOL/L (ref 6–16)
BUN SERPL-MCNC: 11 MG/DL (ref 8–24)
CALCIUM SERPL-MCNC: 8.9 MG/DL (ref 8.5–10.1)
CHLORIDE SERPL-SCNC: 110 MMOL/L (ref 98–108)
CO2 SERPL-SCNC: 27 MMOL/L (ref 21–32)
CREAT SERPL-MCNC: 0.65 MG/DL (ref 0.6–1.2)
GLUCOSE SERPL-MCNC: 144 MG/DL (ref 70–99)
PHOSPHATE SERPL-MCNC: 3.9 MG/DL (ref 2.5–4.9)
POTASSIUM SERPL-SCNC: 3.4 MMOL/L (ref 3.5–5.5)
SODIUM SERPL-SCNC: 143 MMOL/L (ref 136–145)

## 2023-09-01 NOTE — NUR
THE PATIENT WAS DISCHARGED TO [facility]. AFTER REPORT WAS CALLED IN
AND HISS IV'S WERE REMOVED. THE PATINT LEFT THE HOSPITAL VIA WHEELCHAIR AND
HIS S/O TRANSPORTED HIM THE REHAB.

## 2023-09-01 NOTE — NUR
NO ACUTE CHANGES NOTED, PT IS AOX4, CALLS APPROPRIATELY, VSS ON RA,
REPOSITIONS INDEPENDENTLY TOLERATES MEDS WHOLE WITH THICKENED LIQUID,
PLEASANT. FIRE SAFETY REVIEWED.

## 2023-10-07 ENCOUNTER — HOSPITAL ENCOUNTER (EMERGENCY)
Dept: HOSPITAL 95 - ER | Age: 48
Discharge: HOME | End: 2023-10-07
Payer: COMMERCIAL

## 2023-10-07 VITALS — HEIGHT: 71 IN | WEIGHT: 150 LBS | BODY MASS INDEX: 21 KG/M2

## 2023-10-07 VITALS — SYSTOLIC BLOOD PRESSURE: 176 MMHG | DIASTOLIC BLOOD PRESSURE: 89 MMHG

## 2023-10-07 DIAGNOSIS — R53.1: Primary | ICD-10-CM

## 2023-10-07 DIAGNOSIS — Z79.82: ICD-10-CM

## 2023-10-07 DIAGNOSIS — E11.9: ICD-10-CM

## 2023-10-07 DIAGNOSIS — Z79.899: ICD-10-CM

## 2023-10-07 DIAGNOSIS — Z88.8: ICD-10-CM

## 2023-10-07 DIAGNOSIS — I10: ICD-10-CM

## 2023-10-07 LAB
ALBUMIN SERPL BCP-MCNC: 4.3 G/DL (ref 3.4–5)
ALBUMIN/GLOB SERPL: 1.6 {RATIO} (ref 0.8–1.8)
ALT SERPL W P-5'-P-CCNC: 22 U/L (ref 12–78)
ANION GAP SERPL CALCULATED.4IONS-SCNC: 6 MMOL/L (ref 6–16)
AST SERPL W P-5'-P-CCNC: 16 U/L (ref 12–37)
BASOPHILS # BLD AUTO: 0.06 K/MM3 (ref 0–0.23)
BASOPHILS NFR BLD AUTO: 1 % (ref 0–2)
BILIRUB SERPL-MCNC: 0.7 MG/DL (ref 0.1–1)
BUN SERPL-MCNC: 16 MG/DL (ref 8–24)
CALCIUM SERPL-MCNC: 9.1 MG/DL (ref 8.5–10.1)
CHLORIDE SERPL-SCNC: 110 MMOL/L (ref 98–108)
CO2 SERPL-SCNC: 27 MMOL/L (ref 21–32)
CREAT SERPL-MCNC: 0.79 MG/DL (ref 0.6–1.2)
DEPRECATED RDW RBC AUTO: 41.2 FL (ref 35.1–46.3)
EOSINOPHIL # BLD AUTO: 0.06 K/MM3 (ref 0–0.68)
EOSINOPHIL NFR BLD AUTO: 1 % (ref 0–6)
ERYTHROCYTE [DISTWIDTH] IN BLOOD BY AUTOMATED COUNT: 12.6 % (ref 11.7–14.2)
GLOBULIN SER CALC-MCNC: 2.7 G/DL (ref 2.2–4)
GLUCOSE SERPL-MCNC: 162 MG/DL (ref 70–99)
GLUCOSE UR-MCNC: (no result) MG/DL
HCT VFR BLD AUTO: 42.7 % (ref 37–53)
HGB BLD-MCNC: 14.8 G/DL (ref 13.5–17.5)
IMM GRANULOCYTES # BLD AUTO: 0.03 K/MM3 (ref 0–0.1)
IMM GRANULOCYTES NFR BLD AUTO: 0 % (ref 0–1)
KETONES UR STRIP-MCNC: (no result) MG/DL
LYMPHOCYTES # BLD AUTO: 2.04 K/MM3 (ref 0.84–5.2)
LYMPHOCYTES NFR BLD AUTO: 22 % (ref 21–46)
MCHC RBC AUTO-ENTMCNC: 34.7 G/DL (ref 31.5–36.5)
MCV RBC AUTO: 91 FL (ref 80–100)
MONOCYTES # BLD AUTO: 0.74 K/MM3 (ref 0.16–1.47)
MONOCYTES NFR BLD AUTO: 8 % (ref 4–13)
NEUTROPHILS # BLD AUTO: 6.54 K/MM3 (ref 1.96–9.15)
NEUTROPHILS NFR BLD AUTO: 69 % (ref 41–73)
NRBC # BLD AUTO: 0 K/MM3 (ref 0–0.02)
NRBC BLD AUTO-RTO: 0 /100 WBC (ref 0–0.2)
PLATELET # BLD AUTO: 287 K/MM3 (ref 150–400)
POTASSIUM SERPL-SCNC: 3.9 MMOL/L (ref 3.5–5.5)
PROT SERPL-MCNC: 7 G/DL (ref 6.4–8.2)
PROT UR STRIP-MCNC: (no result) MG/DL
SODIUM SERPL-SCNC: 143 MMOL/L (ref 136–145)
SP GR SPEC: 1.02 (ref 1–1.02)
UROBILINOGEN UR STRIP-MCNC: (no result) MG/DL

## 2025-02-13 NOTE — NUR
A&OX3, DENIES ANY NEED FOR PAIN MEDS AT THIS TIME, AMBULATING DOWN THE HALLS
W/ CANE, CONT. TO MONITOR FOR ANY CHANGES. [FreeTextEntry1] : No evidence of infection or collection. Wound care, activity restrictions were reviewed. Follow-up as needed.